# Patient Record
Sex: FEMALE | Race: AMERICAN INDIAN OR ALASKA NATIVE | NOT HISPANIC OR LATINO
[De-identification: names, ages, dates, MRNs, and addresses within clinical notes are randomized per-mention and may not be internally consistent; named-entity substitution may affect disease eponyms.]

---

## 2020-05-28 PROBLEM — Z00.00 ENCOUNTER FOR PREVENTIVE HEALTH EXAMINATION: Status: ACTIVE | Noted: 2020-05-28

## 2020-07-07 ENCOUNTER — APPOINTMENT (OUTPATIENT)
Dept: CARDIOLOGY | Facility: CLINIC | Age: 55
End: 2020-07-07

## 2022-08-27 ENCOUNTER — EMERGENCY (EMERGENCY)
Facility: HOSPITAL | Age: 57
LOS: 0 days | Discharge: HOME | End: 2022-08-27
Attending: STUDENT IN AN ORGANIZED HEALTH CARE EDUCATION/TRAINING PROGRAM | Admitting: STUDENT IN AN ORGANIZED HEALTH CARE EDUCATION/TRAINING PROGRAM

## 2022-08-27 VITALS
WEIGHT: 130.07 LBS | OXYGEN SATURATION: 98 % | TEMPERATURE: 97 F | RESPIRATION RATE: 18 BRPM | HEART RATE: 79 BPM | SYSTOLIC BLOOD PRESSURE: 126 MMHG | DIASTOLIC BLOOD PRESSURE: 75 MMHG

## 2022-08-27 DIAGNOSIS — K08.89 OTHER SPECIFIED DISORDERS OF TEETH AND SUPPORTING STRUCTURES: ICD-10-CM

## 2022-08-27 DIAGNOSIS — K02.9 DENTAL CARIES, UNSPECIFIED: ICD-10-CM

## 2022-08-27 DIAGNOSIS — I10 ESSENTIAL (PRIMARY) HYPERTENSION: ICD-10-CM

## 2022-08-27 PROCEDURE — 99283 EMERGENCY DEPT VISIT LOW MDM: CPT

## 2022-08-27 RX ORDER — IBUPROFEN 200 MG
600 TABLET ORAL ONCE
Refills: 0 | Status: COMPLETED | OUTPATIENT
Start: 2022-08-27 | End: 2022-08-27

## 2022-08-27 NOTE — ED PROVIDER NOTE - NSFOLLOWUPINSTRUCTIONS_ED_ALL_ED_FT
Please make sure to come back to this emergency department for dental clinic between Monday and Friday from 9AM to 4PM.       Toothache    WHAT YOU NEED TO KNOW:    What is a toothache and what causes it? A toothache is pain that is caused by irritation of the nerves in the center of your tooth. The irritation may be caused by several problems, such as a cavity, an infection, a cracked tooth, or gum disease.  Tooth Anatomy         How is a toothache diagnosed? Your healthcare provider will ask how long you have had a toothache. He or she will also ask if you have any other symptoms or medical conditions. Your healthcare provider will examine your tooth and mouth. Your provider may also examine your face and neck. You may need x-rays to check for an infection or cracked tooth.     How is a toothache treated? Treatment depends on the cause of your toothache. You may need any of the following:   •NSAIDs, such as ibuprofen, help decrease swelling, pain, and fever. This medicine is available with or without a doctor's order. NSAIDs can cause stomach bleeding or kidney problems in certain people. If you take blood thinner medicine, always ask if NSAIDs are safe for you. Always read the medicine label and follow directions. Do not give these medicines to children younger than 6 months without direction from a healthcare provider.      •Acetaminophen decreases pain and fever. It is available without a doctor's order. Ask how much to take and how often to take it. Follow directions. Acetaminophen can cause liver damage if not taken correctly.      •Prescription pain medicine may be given. Ask your healthcare provider how to take this medicine safely. Some prescription pain medicines contain acetaminophen. Do not take other medicines that contain acetaminophen without talking to your healthcare provider. Too much acetaminophen may cause liver damage. Prescription pain medicine may cause constipation. Ask your healthcare provider how to prevent or treat constipation.       •Antibiotics help treat or prevent a bacterial infection.       How can I manage my toothache?   •Rinse your mouth with warm salt water 4 times a day or as directed.       •Eat soft foods to help relieve pain caused by chewing.       •Apply ice on your jaw or cheek for 15 to 20 minutes every hour or as directed. Use an ice pack, or put crushed ice in a plastic bag. Cover it with a towel before you apply it. Ice helps prevent tissue damage and decreases swelling and pain.      How can I help prevent a toothache?   •Brush your teeth at least 2 times a day.      •Use dental floss to clean between your teeth at least 1 time a day.      •See your dentist regularly every 6 months for dental cleanings and oral exams.      When should I seek immediate care?   •You have trouble breathing or swallowing.       •You have swelling in your face or neck.       When should I contact my dentist?   •You have a fever and chills.       •You have trouble opening or closing your mouth.       •You have swelling around your tooth.       •You have questions or concerns about your condition or care.      CARE AGREEMENT:    You have the right to help plan your care. Learn about your health condition and how it may be treated. Discuss treatment options with your healthcare providers to decide what care you want to receive. You always have the right to refuse treatment.

## 2022-08-27 NOTE — ED PROVIDER NOTE - PROVIDER TOKENS
FREE:[LAST:[Please make sure to follow up wit [facility] primary care doctor in 3 days],PHONE:[(   )    -],FAX:[(   )    -]]

## 2022-08-27 NOTE — ED PROVIDER NOTE - CARE PROVIDER_API CALL
Please make sure to follow up wit [facility] primary care doctor in 3 days,   Phone: (   )    -  Fax: (   )    -  Follow Up Time:

## 2022-08-27 NOTE — ED PROVIDER NOTE - PHYSICAL EXAMINATION
CONSTITUTIONAL: ***Well-appearing; in no apparent distress.   HEAD: Normocephalic; atraumatic.   EYES: Pupils are round and reactive, extra-ocular muscles are intact. Eyelids are normal in appearance without swelling or lesions.   ENT: Tooth #16 cavity noticed; tender to palpation; no active abscess or drainage noticed. Hearing is intact with good acuity to spoken voice.  Patient is speaking clearly, not muffled and airway is intact.   RESPIRATORY: No signs of respiratory distress. Lung sounds are clear in all lobes bilaterally without rales, rhonchi, or wheezes.  CARDIOVASCULAR: Regular rate and rhythm.   NEURO: A & O x 3. Normal speech.  PSYCHOLOGICAL: Appropriate mood and affect. Good judgement and insight.

## 2022-08-27 NOTE — ED PROVIDER NOTE - NS ED ATTENDING STATEMENT MOD
This was a shared visit with the JANA. I reviewed and verified the documentation and independently performed the documented:

## 2022-08-27 NOTE — ED PROVIDER NOTE - PROGRESS NOTE DETAILS
Physical exam is unremarkable. No abscess or drainage noticed. Pt is stable for discharge. Pt has been informed to return to the ER for dental clinic between Monday and Friday from 9A-4P.

## 2022-08-27 NOTE — ED PROVIDER NOTE - OBJECTIVE STATEMENT
56-year-old female with past medical history of hypertension who presents with left upper tooth pain.  Reports that she has been having symptoms for a while, but it has been progressively getting worse since 7 days ago.  Reports that she spoke with her primary dentist and was told that the appointment is not until October.  Patient has been given amoxicillin and has been taking the medication as instructed.  Denies any other alleviating measures.  Denies fever, tongue swelling, shortness of breath, drainage, or discharge.  Denies recent trauma or injury to her teeth.

## 2022-08-27 NOTE — ED PROVIDER NOTE - NS ED ROS FT
Constitutional: Negative for fever, chills, and fatigue.  HENT: Negative for headache  Mouth: +toothache.   Cardiovascular: Negative for chest pain  Respiratory: Negative for SOB  Gastrointestinal: Negative for nausea, vomiting, abdominal pain

## 2022-08-27 NOTE — ED PROVIDER NOTE - PATIENT PORTAL LINK FT
You can access the FollowMyHealth Patient Portal offered by [facility] by registering at the following website: [email or web address]. By joining [employer]’s FollowMyHealth portal, you will also be able to view your health information using other applications (apps) compatible with our system.

## 2023-09-13 ENCOUNTER — EMERGENCY (EMERGENCY)
Facility: HOSPITAL | Age: 58
LOS: 0 days | Discharge: HOME | End: 2023-09-14
Attending: EMERGENCY MEDICINE
Payer: MEDICAID

## 2023-09-13 VITALS
HEART RATE: 78 BPM | HEIGHT: 62 IN | WEIGHT: 134.92 LBS | OXYGEN SATURATION: 98 % | TEMPERATURE: 98 F | RESPIRATION RATE: 18 BRPM | SYSTOLIC BLOOD PRESSURE: 178 MMHG | DIASTOLIC BLOOD PRESSURE: 81 MMHG

## 2023-09-13 DIAGNOSIS — M54.9 DORSALGIA, UNSPECIFIED: ICD-10-CM

## 2023-09-13 DIAGNOSIS — M54.50 LOW BACK PAIN, UNSPECIFIED: ICD-10-CM

## 2023-09-13 DIAGNOSIS — I10 ESSENTIAL (PRIMARY) HYPERTENSION: ICD-10-CM

## 2023-09-13 PROCEDURE — 99284 EMERGENCY DEPT VISIT MOD MDM: CPT | Mod: 25

## 2023-09-13 PROCEDURE — 36415 COLL VENOUS BLD VENIPUNCTURE: CPT

## 2023-09-13 PROCEDURE — 85025 COMPLETE CBC W/AUTO DIFF WBC: CPT

## 2023-09-13 PROCEDURE — 74177 CT ABD & PELVIS W/CONTRAST: CPT | Mod: MA

## 2023-09-13 PROCEDURE — 80053 COMPREHEN METABOLIC PANEL: CPT

## 2023-09-13 PROCEDURE — 99285 EMERGENCY DEPT VISIT HI MDM: CPT

## 2023-09-13 PROCEDURE — 96374 THER/PROPH/DIAG INJ IV PUSH: CPT | Mod: XU

## 2023-09-13 RX ORDER — METHOCARBAMOL 500 MG/1
2 TABLET, FILM COATED ORAL
Qty: 20 | Refills: 0
Start: 2023-09-13 | End: 2023-09-17

## 2023-09-13 RX ORDER — ACETAMINOPHEN 500 MG
650 TABLET ORAL ONCE
Refills: 0 | Status: COMPLETED | OUTPATIENT
Start: 2023-09-13 | End: 2023-09-13

## 2023-09-13 RX ORDER — DEXAMETHASONE 0.5 MG/5ML
10 ELIXIR ORAL ONCE
Refills: 0 | Status: COMPLETED | OUTPATIENT
Start: 2023-09-13 | End: 2023-09-13

## 2023-09-13 RX ORDER — VANCOMYCIN HCL 1 G
1000 VIAL (EA) INTRAVENOUS ONCE
Refills: 0 | Status: DISCONTINUED | OUTPATIENT
Start: 2023-09-13 | End: 2023-09-13

## 2023-09-13 RX ORDER — DIAZEPAM 5 MG
5 TABLET ORAL ONCE
Refills: 0 | Status: DISCONTINUED | OUTPATIENT
Start: 2023-09-13 | End: 2023-09-13

## 2023-09-13 RX ORDER — LIDOCAINE 4 G/100G
1 CREAM TOPICAL
Qty: 1 | Refills: 0
Start: 2023-09-13 | End: 2023-09-17

## 2023-09-13 RX ORDER — CEFEPIME 1 G/1
2000 INJECTION, POWDER, FOR SOLUTION INTRAMUSCULAR; INTRAVENOUS ONCE
Refills: 0 | Status: DISCONTINUED | OUTPATIENT
Start: 2023-09-13 | End: 2023-09-13

## 2023-09-13 RX ORDER — KETOROLAC TROMETHAMINE 30 MG/ML
15 SYRINGE (ML) INJECTION ONCE
Refills: 0 | Status: DISCONTINUED | OUTPATIENT
Start: 2023-09-13 | End: 2023-09-13

## 2023-09-13 RX ORDER — SODIUM CHLORIDE 9 MG/ML
1550 INJECTION, SOLUTION INTRAVENOUS ONCE
Refills: 0 | Status: DISCONTINUED | OUTPATIENT
Start: 2023-09-13 | End: 2023-09-14

## 2023-09-13 RX ORDER — LIDOCAINE 4 G/100G
1 CREAM TOPICAL ONCE
Refills: 0 | Status: COMPLETED | OUTPATIENT
Start: 2023-09-13 | End: 2023-09-13

## 2023-09-13 RX ADMIN — Medication 5 MILLIGRAM(S): at 21:40

## 2023-09-13 RX ADMIN — Medication 10 MILLIGRAM(S): at 20:45

## 2023-09-13 RX ADMIN — LIDOCAINE 1 PATCH: 4 CREAM TOPICAL at 20:45

## 2023-09-13 RX ADMIN — Medication 650 MILLIGRAM(S): at 20:45

## 2023-09-13 NOTE — ED PROVIDER NOTE - NSFOLLOWUPINSTRUCTIONS_ED_ALL_ED_FT
Please follow-up with PCP in 1 to 3 days. Please take ibuprofen and lidocaine patches as needed. Return precautions explained in full to patient/family.    Back Pain    Back pain is very common in adults. The cause of back pain is rarely dangerous and the pain often gets better over time. The cause of your back pain may not be known and may include strain of muscles or ligaments, degeneration of the spinal disks, or arthritis. Occasionally the pain may radiate down your leg(s). Over-the-counter medicines to reduce pain and inflammation are often the most helpful. Stretching and remaining active frequently helps the healing process.     SEEK IMMEDIATE MEDICAL CARE IF YOU HAVE ANY OF THE FOLLOWING SYMPTOMS: bowel or bladder control problems, unusual weakness or numbness in your arms or legs, nausea or vomiting, abdominal pain, fever, dizziness/lightheadedness. Please follow-up with PCP in 1 to 3 days. Please take ibuprofen and lidocaine patches as needed. Return precautions explained in full to patient/family.    Our Emergency Department Referral Coordinators will be reaching out to you in the next 24-48 hours from 9:00am to 5:00pm with a follow up appointment. Please expect a phone call from the hospital in that time frame. If you do not receive a call or if you have any questions or concerns, you can reach them at   [phone]    Back Pain    Back pain is very common in adults. The cause of back pain is rarely dangerous and the pain often gets better over time. The cause of your back pain may not be known and may include strain of muscles or ligaments, degeneration of the spinal disks, or arthritis. Occasionally the pain may radiate down your leg(s). Over-the-counter medicines to reduce pain and inflammation are often the most helpful. Stretching and remaining active frequently helps the healing process.     SEEK IMMEDIATE MEDICAL CARE IF YOU HAVE ANY OF THE FOLLOWING SYMPTOMS: bowel or bladder control problems, unusual weakness or numbness in your arms or legs, nausea or vomiting, abdominal pain, fever, dizziness/lightheadedness.

## 2023-09-13 NOTE — ED PROVIDER NOTE - PHYSICAL EXAMINATION
CONSTITUTIONAL: NAD  SKIN: Warm dry  HEAD: NCAT  EYES: NL inspection  ENT: MMM  NECK: Supple; non tender.  CARD: RRR  RESP: CTAB  EXT: Bilateral positive straight leg raises, unable to ambulate.  Neurovascularly intact.  Tender to left lumbar region.  No spinal tenderness  NEURO: Grossly unremarkable  PSYCH: Cooperative, appropriate.

## 2023-09-13 NOTE — ED ADULT NURSE NOTE - OBJECTIVE STATEMENT
pt complains of lower back pain, left greater than right side, radiates down leg, to states that she went to chiropractor and had acupuncture

## 2023-09-13 NOTE — ED ADULT NURSE NOTE - NS ED NURSE DC PT EDUCATION RESOURCES
SUBJECTIVE: The patient is a 14-year-old male who comes in today with recurrent pain in his left groin area. He is currently playing football and notes that when he runs. He tends to have quite a bit of pain associated in his left groin area. He denies any trauma. He states that the pain improves after he rests for a couple days. He has not tried any medication for this. He denies numbness or tingling or any bulging associated with it.    MEDICATIONS: Reviewed in Epic.     ALLERGIES: Reviewed in Epic.    OBJECTIVE  Visit Vitals  /68   Pulse 72   Wt 54.8 kg     Patient has full range of motion of his left hip. He has tenderness in the groin itself without any evidence of hernia.    ASSESSMENT:   1. Groin strain, left, initial encounter         PLAN: We will have him see physical therapy. He continues ibuprofen or naproxen twice daily.  Orders Placed This Encounter   • SERVICE TO PHYSICAL THERAPY       Yes

## 2023-09-13 NOTE — ED ADULT NURSE NOTE - NSFALLUNIVINTERV_ED_ALL_ED
Bed/Stretcher in lowest position, wheels locked, appropriate side rails in place/Call bell, personal items and telephone in reach/Instruct patient to call for assistance before getting out of bed/chair/stretcher/Non-slip footwear applied when patient is off stretcher/[place] to call system/Physically safe environment - no spills, clutter or unnecessary equipment/Purposeful proactive rounding/Room/bathroom lighting operational, light cord in reach

## 2023-09-13 NOTE — ED ADULT NURSE REASSESSMENT NOTE - NS ED NURSE REASSESS COMMENT FT1
ot is aaox3, nad, respirations easy and regular, skin is warm, dry, and normal in color, pt is resting and comfortable

## 2023-09-13 NOTE — ED PROVIDER NOTE - PROGRESS NOTE DETAILS
SG: Patient assessed at bedside.  Patient able to ambulate.  Wants to go home. ADDENDUM by Deion Frey M.D.: I received sign-off from Dr. TESSIE Mayer. 57-year-old female with no past medical history presents with back pain which worsened today after acupuncture, high suspicion for MSK symptoms and patient discharged however requesting CT, I was to follow-up labs and CT. On my assessment, patient reports mid low back pain rating to bilateral legs though left greater than right, worsened with position changes. Denies all other symptoms including numbness or weakness of legs, abdominal pain, fever, vomiting, urinary changes. On exam, NAD, well-appearing, walking comfortably from bathroom, no WOB, abdomen soft/tender/undescended, no back TTP/step-off/deformity, no CVAT, motor 5/5 in all extremities. CT and labs unremarkable. Patient is well appearing, NAD, afebrile, hemodynamically stable. Any available tests and studies were discussed with patient. Discharged with instructions in further symptomatic care, return precautions, and need for PMD and possible spine f/u.

## 2023-09-13 NOTE — ED ADULT TRIAGE NOTE - CHIEF COMPLAINT QUOTE
" I went to chiropractor this morning for my back pain and now it hurts to stand and sit and pain goes down my legs."

## 2023-09-13 NOTE — ED PROVIDER NOTE - OBJECTIVE STATEMENT
57-year-old female past medical history of HTN coming with pains of back pain.  Patient reports that she suffers from intermittent mild back pain, went to her usual acupuncturist who performed procedure on her but then began to feel immediate terrible left-sided back pain, electrical and spasming radiating down her left leg, unable to ambulate.  Took Aleve right afterwards With minimal relief. Denies any fever, chills, nausea, vomiting, CP, SOB, changes in urination, or changes in bowel movements.

## 2023-09-13 NOTE — ED PROVIDER NOTE - PATIENT PORTAL LINK FT
You can access the FollowMyHealth Patient Portal offered by [facility] by registering at the following website: [email or web address]. By joining [employer]’s FollowMyHealth portal, you will also be able to view your health information using other applications (apps) compatible with our system. You can access the FollowMyHealth Patient Portal offered by [facility] by registering at the following website: [email or web address]. By joining [employer] [facility]’s FollowMyHealth portal, you will also be able to view your health information using other applications (apps) compatible with our system.

## 2023-09-13 NOTE — ED PROVIDER NOTE - ATTENDING CONTRIBUTION TO CARE
57-year-old female with history of hypertension, chronic back pain presenting today with severe lower back pain radiating down the left lower extremity ever since she had acupuncture.  Denies any urinary incontinence or retention, denies any bowel incontinence or retention, denies any saddle anesthesia, denies any fevers.  Patient ambulatory, no significant midline tenderness noted however patient appears uncomfortable, minimally able to ambulate on my assessment.  Nontender abdomen.  Despite appropriate medications, although patient did improve patient did not feel comfortable going home without a CT concerning for injury after acupuncture.  Endorsed to Dr. Frey pending imaging.  Reassessment and disposition.

## 2023-09-14 ENCOUNTER — RESULT REVIEW (OUTPATIENT)
Age: 58
End: 2023-09-14

## 2023-09-14 ENCOUNTER — OUTPATIENT (OUTPATIENT)
Dept: OUTPATIENT SERVICES | Facility: HOSPITAL | Age: 58
LOS: 1 days | End: 2023-09-14
Payer: MEDICAID

## 2023-09-14 ENCOUNTER — APPOINTMENT (OUTPATIENT)
Dept: NEUROSURGERY | Facility: CLINIC | Age: 58
End: 2023-09-14
Payer: MEDICAID

## 2023-09-14 VITALS — WEIGHT: 135 LBS | HEIGHT: 64 IN | BODY MASS INDEX: 23.05 KG/M2

## 2023-09-14 DIAGNOSIS — Z78.9 OTHER SPECIFIED HEALTH STATUS: ICD-10-CM

## 2023-09-14 DIAGNOSIS — M54.16 RADICULOPATHY, LUMBAR REGION: ICD-10-CM

## 2023-09-14 DIAGNOSIS — I10 ESSENTIAL (PRIMARY) HYPERTENSION: ICD-10-CM

## 2023-09-14 LAB
ALBUMIN SERPL ELPH-MCNC: 3.8 G/DL — SIGNIFICANT CHANGE UP (ref 3.5–5.2)
ALP SERPL-CCNC: 49 U/L — SIGNIFICANT CHANGE UP (ref 30–115)
ALT FLD-CCNC: 11 U/L — SIGNIFICANT CHANGE UP (ref 0–41)
ANION GAP SERPL CALC-SCNC: 9 MMOL/L — SIGNIFICANT CHANGE UP (ref 7–14)
AST SERPL-CCNC: 20 U/L — SIGNIFICANT CHANGE UP (ref 0–41)
BASOPHILS # BLD AUTO: 0.01 K/UL — SIGNIFICANT CHANGE UP (ref 0–0.2)
BASOPHILS NFR BLD AUTO: 0.1 % — SIGNIFICANT CHANGE UP (ref 0–1)
BILIRUB SERPL-MCNC: 0.5 MG/DL — SIGNIFICANT CHANGE UP (ref 0.2–1.2)
BUN SERPL-MCNC: 15 MG/DL — SIGNIFICANT CHANGE UP (ref 10–20)
CALCIUM SERPL-MCNC: 8.7 MG/DL — SIGNIFICANT CHANGE UP (ref 8.4–10.4)
CHLORIDE SERPL-SCNC: 94 MMOL/L — LOW (ref 98–110)
CO2 SERPL-SCNC: 26 MMOL/L — SIGNIFICANT CHANGE UP (ref 17–32)
CREAT SERPL-MCNC: 0.6 MG/DL — LOW (ref 0.7–1.5)
EGFR: 105 ML/MIN/1.73M2 — SIGNIFICANT CHANGE UP
EOSINOPHIL # BLD AUTO: 0.01 K/UL — SIGNIFICANT CHANGE UP (ref 0–0.7)
EOSINOPHIL NFR BLD AUTO: 0.1 % — SIGNIFICANT CHANGE UP (ref 0–8)
GLUCOSE SERPL-MCNC: 139 MG/DL — HIGH (ref 70–99)
HCT VFR BLD CALC: 36.5 % — LOW (ref 37–47)
HGB BLD-MCNC: 12.2 G/DL — SIGNIFICANT CHANGE UP (ref 12–16)
IMM GRANULOCYTES NFR BLD AUTO: 0.2 % — SIGNIFICANT CHANGE UP (ref 0.1–0.3)
LYMPHOCYTES # BLD AUTO: 0.8 K/UL — LOW (ref 1.2–3.4)
LYMPHOCYTES # BLD AUTO: 7.7 % — LOW (ref 20.5–51.1)
MCHC RBC-ENTMCNC: 29.6 PG — SIGNIFICANT CHANGE UP (ref 27–31)
MCHC RBC-ENTMCNC: 33.4 G/DL — SIGNIFICANT CHANGE UP (ref 32–37)
MCV RBC AUTO: 88.6 FL — SIGNIFICANT CHANGE UP (ref 81–99)
MONOCYTES # BLD AUTO: 0.07 K/UL — LOW (ref 0.1–0.6)
MONOCYTES NFR BLD AUTO: 0.7 % — LOW (ref 1.7–9.3)
NEUTROPHILS # BLD AUTO: 9.54 K/UL — HIGH (ref 1.4–6.5)
NEUTROPHILS NFR BLD AUTO: 91.2 % — HIGH (ref 42.2–75.2)
NRBC # BLD: 0 /100 WBCS — SIGNIFICANT CHANGE UP (ref 0–0)
PLATELET # BLD AUTO: 242 K/UL — SIGNIFICANT CHANGE UP (ref 130–400)
PMV BLD: 10.3 FL — SIGNIFICANT CHANGE UP (ref 7.4–10.4)
POTASSIUM SERPL-MCNC: 4.6 MMOL/L — SIGNIFICANT CHANGE UP (ref 3.5–5)
POTASSIUM SERPL-SCNC: 4.6 MMOL/L — SIGNIFICANT CHANGE UP (ref 3.5–5)
PROT SERPL-MCNC: 6.7 G/DL — SIGNIFICANT CHANGE UP (ref 6–8)
RBC # BLD: 4.12 M/UL — LOW (ref 4.2–5.4)
RBC # FLD: 12.7 % — SIGNIFICANT CHANGE UP (ref 11.5–14.5)
SODIUM SERPL-SCNC: 129 MMOL/L — LOW (ref 135–146)
WBC # BLD: 10.45 K/UL — SIGNIFICANT CHANGE UP (ref 4.8–10.8)
WBC # FLD AUTO: 10.45 K/UL — SIGNIFICANT CHANGE UP (ref 4.8–10.8)

## 2023-09-14 PROCEDURE — 99202 OFFICE O/P NEW SF 15 MIN: CPT

## 2023-09-14 PROCEDURE — 72110 X-RAY EXAM L-2 SPINE 4/>VWS: CPT | Mod: 26

## 2023-09-14 PROCEDURE — 72110 X-RAY EXAM L-2 SPINE 4/>VWS: CPT

## 2023-09-14 PROCEDURE — 74177 CT ABD & PELVIS W/CONTRAST: CPT | Mod: 26,MA

## 2023-09-14 RX ADMIN — Medication 15 MILLIGRAM(S): at 02:31

## 2023-09-15 ENCOUNTER — NON-APPOINTMENT (OUTPATIENT)
Age: 58
End: 2023-09-15

## 2023-09-15 DIAGNOSIS — M54.16 RADICULOPATHY, LUMBAR REGION: ICD-10-CM

## 2023-09-26 ENCOUNTER — NON-APPOINTMENT (OUTPATIENT)
Age: 58
End: 2023-09-26

## 2023-11-09 ENCOUNTER — APPOINTMENT (OUTPATIENT)
Dept: NEUROSURGERY | Facility: CLINIC | Age: 58
End: 2023-11-09
Payer: MEDICAID

## 2023-11-09 VITALS — HEIGHT: 64 IN | WEIGHT: 135 LBS | BODY MASS INDEX: 23.05 KG/M2

## 2023-11-09 DIAGNOSIS — M54.16 RADICULOPATHY, LUMBAR REGION: ICD-10-CM

## 2023-11-09 PROCEDURE — 99213 OFFICE O/P EST LOW 20 MIN: CPT

## 2023-11-29 ENCOUNTER — RESULT REVIEW (OUTPATIENT)
Age: 58
End: 2023-11-29

## 2023-11-29 ENCOUNTER — OUTPATIENT (OUTPATIENT)
Dept: OUTPATIENT SERVICES | Facility: HOSPITAL | Age: 58
LOS: 1 days | End: 2023-11-29
Payer: MEDICAID

## 2023-11-29 DIAGNOSIS — M54.16 RADICULOPATHY, LUMBAR REGION: ICD-10-CM

## 2023-11-29 PROCEDURE — 72148 MRI LUMBAR SPINE W/O DYE: CPT

## 2023-11-29 PROCEDURE — 72148 MRI LUMBAR SPINE W/O DYE: CPT | Mod: 26

## 2023-11-30 DIAGNOSIS — M54.16 RADICULOPATHY, LUMBAR REGION: ICD-10-CM

## 2024-01-29 ENCOUNTER — APPOINTMENT (OUTPATIENT)
Dept: NEUROSURGERY | Facility: CLINIC | Age: 59
End: 2024-01-29

## 2024-03-07 ENCOUNTER — APPOINTMENT (OUTPATIENT)
Dept: NEUROSURGERY | Facility: CLINIC | Age: 59
End: 2024-03-07
Payer: MEDICAID

## 2024-03-07 VITALS — HEIGHT: 64 IN | BODY MASS INDEX: 22.88 KG/M2 | WEIGHT: 134 LBS

## 2024-03-07 DIAGNOSIS — M48.061 SPINAL STENOSIS, LUMBAR REGION WITHOUT NEUROGENIC CLAUDICATION: ICD-10-CM

## 2024-03-07 PROCEDURE — 99213 OFFICE O/P EST LOW 20 MIN: CPT

## 2024-03-07 NOTE — HISTORY OF PRESENT ILLNESS
[FreeTextEntry1] : CHIEF COMPLAINT:  This is a 58-year-old female who presents for neurosurgical revisit with regards to low back pain with associated radicular features into the bilateral lower extremities, worse on to the left.  MRI L-spine has been obtained which is now available for our review.  Study reveals evidence of severe central and biforaminal stenosis at L4-5 which correlates with her presenting complaints.  Patient has attempted physical therapy in the past without benefit noted.  She has not undergone interventional treatments through pain management and remains hesitant to consider such option.    Continue limitations noted with regards to standing or ambulating for long periods of time.  She often has to rest to alleviate the burden of her pain.  Profound numbness and tingling noted into the extremities with secondary heaviness noted.  CONSERVATIVE MANAGEMENT TRIALED:    No relief from PT  DIAGNOSTIC TESTING:  MRI Lumbar at  on 11/29/23; multilevel degenerative changes appreciated.  Most notable is an area of severe central stenosis at L4-5 with severe biforaminal narrowing as well.  PHYSICAL EXAM:  Constitutional: Well appearing, no distress HEENT: Normocephalic Atraumatic Psychiatric: Alert and oriented to all spheres, normal mood Pulmonary: No respiratory distress  Neurologic:  CN II-XII grossly intact Palpation: (+) lumbar paravertebral tenderness to palpation Strength: Full strength in all major muscle groups Sensation: Full sensation to light touch in all extremities Reflexes:   2+ patellar  2+ ankle jerk  ROM pain-limited with extension; otherwise flex/rotation maintained.  Signs: SLR negative  Gait: steady, walking w/o assistance.

## 2024-03-07 NOTE — ASSESSMENT
[FreeTextEntry1] : This is a 58-year-old female who presents for neurosurgical revisit with regards to lumbar radicular complaints in the setting of L4-5 central stenosis with biforaminal narrowing.  Patient has undergone physical therapy efforts which failed to provide relief.  She is a candidate for interventional treatments through pain management although she remains hesitant to consider such option.  I provided her with basic patient information with regards to such injection benefits and she wishes to contemplate this option.

## 2024-07-11 ENCOUNTER — APPOINTMENT (OUTPATIENT)
Dept: NEUROSURGERY | Facility: CLINIC | Age: 59
End: 2024-07-11

## 2024-07-11 VITALS — WEIGHT: 134 LBS | BODY MASS INDEX: 22.88 KG/M2 | HEIGHT: 64 IN

## 2024-07-11 PROCEDURE — 99213 OFFICE O/P EST LOW 20 MIN: CPT

## 2025-02-02 NOTE — ED PROVIDER NOTE - CLINICAL SUMMARY MEDICAL DECISION MAKING FREE TEXT BOX
Walk in .    56-year-old female PMH HTN presents with atraumatic dentalgia x7-day.  No fever, SOB, drooling.    Agree with exam above.  + Dental caries at tooth #17, no trismus, uvula midline, no floor of mouth tenderness.    Patient given NSAID.  Stable for this stable for discharge home with dental follow-up on Monday.  Patient understands signs symptoms for ED return.  DC home.    .

## 2025-02-13 ENCOUNTER — APPOINTMENT (OUTPATIENT)
Dept: ORTHOPEDIC SURGERY | Facility: CLINIC | Age: 60
End: 2025-02-13
Payer: MEDICAID

## 2025-02-13 DIAGNOSIS — M25.561 PAIN IN RIGHT KNEE: ICD-10-CM

## 2025-02-13 DIAGNOSIS — M25.562 PAIN IN LEFT KNEE: ICD-10-CM

## 2025-02-13 PROCEDURE — 73562 X-RAY EXAM OF KNEE 3: CPT | Mod: 50

## 2025-02-13 PROCEDURE — 99203 OFFICE O/P NEW LOW 30 MIN: CPT

## 2025-02-13 RX ORDER — MELOXICAM 15 MG/1
15 TABLET ORAL
Qty: 30 | Refills: 0 | Status: ACTIVE | COMMUNITY
Start: 2025-02-13 | End: 1900-01-01

## 2025-02-26 ENCOUNTER — OUTPATIENT (OUTPATIENT)
Dept: OUTPATIENT SERVICES | Facility: HOSPITAL | Age: 60
LOS: 1 days | End: 2025-02-26
Payer: MEDICAID

## 2025-02-26 ENCOUNTER — RESULT REVIEW (OUTPATIENT)
Age: 60
End: 2025-02-26

## 2025-02-26 DIAGNOSIS — Z00.8 ENCOUNTER FOR OTHER GENERAL EXAMINATION: ICD-10-CM

## 2025-02-26 DIAGNOSIS — M25.561 PAIN IN RIGHT KNEE: ICD-10-CM

## 2025-02-26 PROCEDURE — 73721 MRI JNT OF LWR EXTRE W/O DYE: CPT | Mod: RT

## 2025-02-26 PROCEDURE — 93971 EXTREMITY STUDY: CPT | Mod: 26,RT

## 2025-02-26 PROCEDURE — 73721 MRI JNT OF LWR EXTRE W/O DYE: CPT | Mod: 26,RT

## 2025-02-26 PROCEDURE — 93971 EXTREMITY STUDY: CPT | Mod: RT

## 2025-02-27 DIAGNOSIS — M25.561 PAIN IN RIGHT KNEE: ICD-10-CM

## 2025-03-12 ENCOUNTER — RX RENEWAL (OUTPATIENT)
Age: 60
End: 2025-03-12

## 2025-04-02 ENCOUNTER — APPOINTMENT (OUTPATIENT)
Dept: ORTHOPEDIC SURGERY | Facility: CLINIC | Age: 60
End: 2025-04-02
Payer: MEDICAID

## 2025-04-02 DIAGNOSIS — S83.521D SPRAIN OF POSTERIOR CRUCIATE LIGAMENT OF RIGHT KNEE, SUBSEQUENT ENCOUNTER: ICD-10-CM

## 2025-04-02 PROCEDURE — 99213 OFFICE O/P EST LOW 20 MIN: CPT

## 2025-04-08 ENCOUNTER — RX RENEWAL (OUTPATIENT)
Age: 60
End: 2025-04-08

## 2025-05-05 ENCOUNTER — RX RENEWAL (OUTPATIENT)
Age: 60
End: 2025-05-05